# Patient Record
Sex: FEMALE | Race: OTHER
[De-identification: names, ages, dates, MRNs, and addresses within clinical notes are randomized per-mention and may not be internally consistent; named-entity substitution may affect disease eponyms.]

---

## 2017-08-02 ENCOUNTER — RESULT REVIEW (OUTPATIENT)
Age: 22
End: 2017-08-02

## 2017-08-24 ENCOUNTER — EMERGENCY (EMERGENCY)
Facility: HOSPITAL | Age: 22
LOS: 1 days | Discharge: HOME | End: 2017-08-24
Admitting: EMERGENCY MEDICINE
Payer: MEDICAID

## 2017-08-24 VITALS
OXYGEN SATURATION: 100 % | HEART RATE: 83 BPM | RESPIRATION RATE: 15 BRPM | SYSTOLIC BLOOD PRESSURE: 120 MMHG | DIASTOLIC BLOOD PRESSURE: 75 MMHG | TEMPERATURE: 98 F

## 2017-08-24 LAB
APTT BLD: 32.3 SEC — SIGNIFICANT CHANGE UP (ref 27.5–37.4)
BASOPHILS # BLD AUTO: 0.03 K/UL — SIGNIFICANT CHANGE UP (ref 0–0.2)
BASOPHILS NFR BLD AUTO: 0.5 % — SIGNIFICANT CHANGE UP (ref 0–2)
EOSINOPHIL # BLD AUTO: 0.15 K/UL — SIGNIFICANT CHANGE UP (ref 0–0.5)
EOSINOPHIL NFR BLD AUTO: 2.4 % — SIGNIFICANT CHANGE UP (ref 0–6)
HCT VFR BLD CALC: 35.9 % — SIGNIFICANT CHANGE UP (ref 34.5–45)
HGB BLD-MCNC: 12.3 G/DL — SIGNIFICANT CHANGE UP (ref 11.5–15.5)
IMM GRANULOCYTES # BLD AUTO: 0.01 # — SIGNIFICANT CHANGE UP
IMM GRANULOCYTES NFR BLD AUTO: 0.2 % — SIGNIFICANT CHANGE UP (ref 0–1.5)
INR BLD: 1.03 — SIGNIFICANT CHANGE UP (ref 0.88–1.17)
LYMPHOCYTES # BLD AUTO: 2.04 K/UL — SIGNIFICANT CHANGE UP (ref 1–3.3)
LYMPHOCYTES # BLD AUTO: 33.1 % — SIGNIFICANT CHANGE UP (ref 13–44)
MCHC RBC-ENTMCNC: 31.2 PG — SIGNIFICANT CHANGE UP (ref 27–34)
MCHC RBC-ENTMCNC: 34.3 % — SIGNIFICANT CHANGE UP (ref 32–36)
MCV RBC AUTO: 91.1 FL — SIGNIFICANT CHANGE UP (ref 80–100)
MONOCYTES # BLD AUTO: 0.55 K/UL — SIGNIFICANT CHANGE UP (ref 0–0.9)
MONOCYTES NFR BLD AUTO: 8.9 % — SIGNIFICANT CHANGE UP (ref 2–14)
NEUTROPHILS # BLD AUTO: 3.39 K/UL — SIGNIFICANT CHANGE UP (ref 1.8–7.4)
NEUTROPHILS NFR BLD AUTO: 54.9 % — SIGNIFICANT CHANGE UP (ref 43–77)
NRBC # FLD: 0 — SIGNIFICANT CHANGE UP
PLATELET # BLD AUTO: 193 K/UL — SIGNIFICANT CHANGE UP (ref 150–400)
PMV BLD: 9.6 FL — SIGNIFICANT CHANGE UP (ref 7–13)
PROTHROM AB SERPL-ACNC: 11.6 SEC — SIGNIFICANT CHANGE UP (ref 9.8–13.1)
RBC # BLD: 3.94 M/UL — SIGNIFICANT CHANGE UP (ref 3.8–5.2)
RBC # FLD: 11.8 % — SIGNIFICANT CHANGE UP (ref 10.3–14.5)
WBC # BLD: 6.17 K/UL — SIGNIFICANT CHANGE UP (ref 3.8–10.5)
WBC # FLD AUTO: 6.17 K/UL — SIGNIFICANT CHANGE UP (ref 3.8–10.5)

## 2017-08-24 PROCEDURE — 99284 EMERGENCY DEPT VISIT MOD MDM: CPT

## 2017-08-24 NOTE — ED PROVIDER NOTE - PLAN OF CARE
Rest, drink plenty of fluids.  Advance activity as tolerated.  Continue all previously prescribed medications as directed.  Follow up with you outpatient GYN in their office on Monday for repeat blood levels as discussed. Follow up with your primary care physician in 48-72 hours- bring copies of your results.  Return to the Emergency Department for fevers, chills, dizziness, lightheadedness, increased bleeding, abdominal pain, worsening or persistent symptoms OR ANY NEW OR CONCERNING SYMPTOMS.

## 2017-08-24 NOTE — ED ADULT TRIAGE NOTE - CHIEF COMPLAINT QUOTE
Pt c/o needs methotrexate shot for ectopic pregnancy to L fallopian tube.  Pt states Dr. Cesar is expecting her.  Denies any PMHx.

## 2017-08-24 NOTE — ED PROVIDER NOTE - CARE PLAN
Principal Discharge DX:	Ectopic pregnancy  Instructions for follow-up, activity and diet:	Rest, drink plenty of fluids.  Advance activity as tolerated.  Continue all previously prescribed medications as directed.  Follow up with you outpatient GYN in their office on Monday for repeat blood levels as discussed. Follow up with your primary care physician in 48-72 hours- bring copies of your results.  Return to the Emergency Department for fevers, chills, dizziness, lightheadedness, increased bleeding, abdominal pain, worsening or persistent symptoms OR ANY NEW OR CONCERNING SYMPTOMS.

## 2017-08-24 NOTE — ED PROVIDER NOTE - OBJECTIVE STATEMENT
21y/o F with no pertinent PMHx presents to the ED with vaginal bleeding x2w and positive pregnancy test 6d ago. Pt was seen by OB/GYN Dr. Dianne Zimmer outpatient for TV US, was Dx'd with L fallopian tube ectopic pregnancy. She was told to present to the ER to see Dr. Claudia Cesar. Pt has no current pain, minimal bleeding. Denies fever, purulent discharge, pelvic pain, dysuria, any other complaints. NKDA.

## 2017-08-24 NOTE — ED PROVIDER NOTE - MEDICAL DECISION MAKING DETAILS
21y/o with outpatient diagnosed L ectopic pregnancy. GYN paged and aware, they are requesting CBC, CMP, coags, will see patient. Pt is stable.

## 2017-08-25 VITALS
HEART RATE: 79 BPM | DIASTOLIC BLOOD PRESSURE: 78 MMHG | RESPIRATION RATE: 16 BRPM | SYSTOLIC BLOOD PRESSURE: 105 MMHG | OXYGEN SATURATION: 100 % | TEMPERATURE: 98 F

## 2017-08-25 DIAGNOSIS — O00.90 UNSPECIFIED ECTOPIC PREGNANCY WITHOUT INTRAUTERINE PREGNANCY: ICD-10-CM

## 2017-08-25 LAB
ALBUMIN SERPL ELPH-MCNC: 4.5 G/DL — SIGNIFICANT CHANGE UP (ref 3.3–5)
ALP SERPL-CCNC: 53 U/L — SIGNIFICANT CHANGE UP (ref 40–120)
ALT FLD-CCNC: 12 U/L — SIGNIFICANT CHANGE UP (ref 4–33)
AST SERPL-CCNC: 14 U/L — SIGNIFICANT CHANGE UP (ref 4–32)
BILIRUB SERPL-MCNC: 0.4 MG/DL — SIGNIFICANT CHANGE UP (ref 0.2–1.2)
BUN SERPL-MCNC: 9 MG/DL — SIGNIFICANT CHANGE UP (ref 7–23)
CALCIUM SERPL-MCNC: 9.1 MG/DL — SIGNIFICANT CHANGE UP (ref 8.4–10.5)
CHLORIDE SERPL-SCNC: 104 MMOL/L — SIGNIFICANT CHANGE UP (ref 98–107)
CO2 SERPL-SCNC: 25 MMOL/L — SIGNIFICANT CHANGE UP (ref 22–31)
CREAT SERPL-MCNC: 0.65 MG/DL — SIGNIFICANT CHANGE UP (ref 0.5–1.3)
GLUCOSE SERPL-MCNC: 90 MG/DL — SIGNIFICANT CHANGE UP (ref 70–99)
HCG SERPL-ACNC: 57.77 MIU/ML — SIGNIFICANT CHANGE UP
POTASSIUM SERPL-MCNC: 4.2 MMOL/L — SIGNIFICANT CHANGE UP (ref 3.5–5.3)
POTASSIUM SERPL-SCNC: 4.2 MMOL/L — SIGNIFICANT CHANGE UP (ref 3.5–5.3)
PROT SERPL-MCNC: 7.2 G/DL — SIGNIFICANT CHANGE UP (ref 6–8.3)
SODIUM SERPL-SCNC: 142 MMOL/L — SIGNIFICANT CHANGE UP (ref 135–145)

## 2017-08-25 RX ORDER — METHOTREXATE 2.5 MG/1
85 TABLET ORAL ONCE
Qty: 0 | Refills: 0 | Status: COMPLETED | OUTPATIENT
Start: 2017-08-25 | End: 2017-08-25

## 2017-08-25 RX ADMIN — METHOTREXATE 85 MILLIGRAM(S): 2.5 TABLET ORAL at 02:59

## 2017-08-25 NOTE — CONSULT NOTE ADULT - PROBLEM SELECTOR RECOMMENDATION 9
- methotrexate IM  - patient to follow up for day 4 and 7 C on 8/28 and 8/31  - patient given pain and bleeding precautions    Patient seen with Dr. Arsenio Oneal PGY-2

## 2017-08-25 NOTE — CONSULT NOTE ADULT - SUBJECTIVE AND OBJECTIVE BOX
HPI:    22y  @ 4 2/7 weeks by LMP 2017  presents from [facility] for methotrexate. Patient reports she had vaginal bleeding on saturday and went to [facility], initially told she was having a spontaneous AB. Patient then followed up outpatient on  and . Patient was found to have a 3.3cm mass in the left adnexa and was sent in for methotrexate. Patient denies HA, CP, SOB, N/V. Reports mild abdominal cramping.        OBHx:G1  GynHx: regular menses  PMH:denies  PSH: denies  All:NDKA  Meds: none    Vital Signs Last 24 Hrs  T(C): 36.9 (24 Aug 2017 21:06), Max: 36.9 (24 Aug 2017 21:06)  T(F): 98.5 (24 Aug 2017 21:06), Max: 98.5 (24 Aug 2017 21:06)  HR: 83 (24 Aug 2017 21:06) (83 - 83)  BP: 120/75 (24 Aug 2017 21:06) (120/75 - 120/75)  BP(mean): --  RR: 15 (24 Aug 2017 21:06) (15 - 15)  SpO2: 100% (24 Aug 2017 21:06) (100% - 100%)    PE:  Gen: Comfortable, NAD  CV: RRR  Pulm: CTAB  Abd: Soft, NT  Ext: No edema or tenderness bilaterally  Spec Exam: 10cc of dark red blood in the vagina. No active bleeding. No cervical or vaginal lesions.     LABS:                        12.3   6.17  )-----------( 193      ( 24 Aug 2017 23:18 )             35.9         142  |  104  |  9   ----------------------------<  90  4.2   |  25  |  0.65    Ca    9.1      24 Aug 2017 23:18    TPro  7.2  /  Alb  4.5  /  TBili  0.4  /  DBili  x   /  AST  14  /  ALT  12  /  AlkPhos  53  -24    PT/INR - ( 24 Aug 2017 23:18 )   PT: 11.6 SEC;   INR: 1.03          PTT - ( 24 Aug 2017 23:18 )  PTT:32.3 SEC      RADIOLOGY & ADDITIONAL STUDIES:    1.5x1.4 cm structure in the left adnexa per reports from [place]

## 2017-08-25 NOTE — ED ADULT NURSE NOTE - OBJECTIVE STATEMENT
3am coverage Note: Pt sitting quielty with  at bedside, discharge instructions in hand....pt s/p methotrexate treatment of ectopic. Pt +readback of discharge instructions...notified importance to follow up with obgyn as instructed return for any increased, pain/bleeding. vss. Pt presently denies any abd pain and reports bleeding as very minimal.

## 2017-08-25 NOTE — ED ADULT NURSE REASSESSMENT NOTE - GENERAL PATIENT STATE
family/SO at bedside/comfortable appearance
family/SO at bedside/comfortable appearance/smiling/interactive

## 2017-08-25 NOTE — CONSULT NOTE ADULT - ATTENDING COMMENTS
Pt seen and examined.  Pt sent from office after USN today showed left ectopic pregnancy measuring 1.5x 1.4cm no FHR, no free fluid.  Pt's exam is benign, no rebound no guarding.  option of medical vs surgical management given, pt opts for medical management with mtx.  pt is a good candidate for medical management.  Labs reviewed and wnl.    Precautions given which included possible need for emergent surgery, failed medical management and death.  informed consent obtained and mtx order placed.  Pt to follow up in office for bhcg on day 4 and 7.  All questions and concerns answered.  Mtx ordered and pt awaiting administration in ER.  Pt to return to ED if si/sx of rupture or anemia which have been described to her.

## 2017-08-25 NOTE — ED ADULT NURSE REASSESSMENT NOTE - NS ED NURSE REASSESS COMMENT FT1
Pt seen ambulating down [facility]" the other Trena said I can leave now....I have my discharge instructions... I know it is very importtant to call OBGYN on Monday "
Pt moved from Intake to 22a, s/p methotrexate tx of Ectopic preg. Pt calm pleasant affect. Pt presently denies any abd discomfort. Pt st" Bleeding very minimal" Pt awaits discharge plan. No iv access in place. vss

## 2017-08-27 ENCOUNTER — TRANSCRIPTION ENCOUNTER (OUTPATIENT)
Age: 22
End: 2017-08-27

## 2018-07-05 NOTE — ED ADULT NURSE NOTE - CCCP TRG CHIEF CMPLNT
Pt noted to be in ED 7.3.18. Follow up call placed to check on patient's status and see if he would be willing to come in for lab check. Left voicemail message for patient to return call.     Rajiv Puckett RN on 7/5/2018 at 9:56 AM     methotrexate shot

## 2018-09-04 ENCOUNTER — RESULT REVIEW (OUTPATIENT)
Age: 23
End: 2018-09-04

## 2019-09-25 ENCOUNTER — RESULT REVIEW (OUTPATIENT)
Age: 24
End: 2019-09-25

## 2020-04-29 ENCOUNTER — MESSAGE (OUTPATIENT)
Age: 25
End: 2020-04-29

## 2021-01-05 ENCOUNTER — TRANSCRIPTION ENCOUNTER (OUTPATIENT)
Age: 26
End: 2021-01-05

## 2023-12-12 ENCOUNTER — APPOINTMENT (OUTPATIENT)
Dept: INTERNAL MEDICINE | Facility: CLINIC | Age: 28
End: 2023-12-12